# Patient Record
Sex: FEMALE | Race: OTHER
[De-identification: names, ages, dates, MRNs, and addresses within clinical notes are randomized per-mention and may not be internally consistent; named-entity substitution may affect disease eponyms.]

---

## 2021-02-09 ENCOUNTER — HOSPITAL ENCOUNTER (EMERGENCY)
Dept: HOSPITAL 72 - EMR | Age: 51
Discharge: HOME | End: 2021-02-09
Payer: MEDICAID

## 2021-02-09 VITALS — DIASTOLIC BLOOD PRESSURE: 63 MMHG | SYSTOLIC BLOOD PRESSURE: 121 MMHG

## 2021-02-09 VITALS — DIASTOLIC BLOOD PRESSURE: 65 MMHG | SYSTOLIC BLOOD PRESSURE: 123 MMHG

## 2021-02-09 VITALS — HEIGHT: 61 IN | BODY MASS INDEX: 41.54 KG/M2 | WEIGHT: 220 LBS

## 2021-02-09 DIAGNOSIS — R51.9: Primary | ICD-10-CM

## 2021-02-09 DIAGNOSIS — E78.00: ICD-10-CM

## 2021-02-09 DIAGNOSIS — E11.9: ICD-10-CM

## 2021-02-09 DIAGNOSIS — I10: ICD-10-CM

## 2021-02-09 PROCEDURE — 99284 EMERGENCY DEPT VISIT MOD MDM: CPT

## 2021-02-09 PROCEDURE — 70450 CT HEAD/BRAIN W/O DYE: CPT

## 2021-02-09 NOTE — NUR
ED Nurse Note:

Pt walked into ED for headaches 6/10 pain and bilateral hand numbness for 
weeks. Pt is alert and orientedx4, ambulatory. She has been seen by ERMD. She 
denies bodyaches, chills, fever, nausea, vomiting.

## 2021-02-09 NOTE — DIAGNOSTIC IMAGING REPORT
Indications: Headache

 

Technique: Spiral acquisitions obtained through the brain. Angled axial and coronal 5

x 5 mm slices were reconstructed. Total dose length product 992 mGycm.  CTDI vol(s)

53 mGy. Dose reduction achieved using automated exposure control

 

Comparison: None.

 

Findings: No acute intracranial hemorrhage or edema. No mass effect or midline shift.

Normal gray-white differentiation. Normal size ventricles and extra axial CSF spaces.

The mastoids are clear. The calvarium is intact. Visualized orbits and sinuses are

unremarkable.

 

Impression: Negative

 

 

 

 

 

The CT scanner at [facility] is accredited by the [employer]

[employer] and the scans are performed using protocols designed to limit radiation

exposure to as low as reasonably achievable to attain images of sufficient resolution

adequate for diagnostic evaluation.

## 2021-02-09 NOTE — EMERGENCY ROOM REPORT
History of Present Illness


General


Chief Complaint:  Headache


Source:  Patient





Present Illness


HPI


Disclaimer: Please note that this report is being documented using DRAGON 

technology. This can lead to erroneous entry secondary to incorrect 

interpretation by the dictating instrument.





HPI: 50-year-old female presents for evaluation of headache. History of 

hypertension, diabetes and hypercholesterolemia. States consistent bitemporal 

headache with intermittent blurred vision for the past 2 weeks. Seen by her PMD 

and screened for Covid 2 weeks ago initial headache onset. Referred by PMD today

to rule out intracranial mass or other pathology. Denies injury, fall, changes 

in mentation. Denies fever or chills. Patient reported to triage nurse that she 

had intermittent tingling in her arms when waking up in the morning. She sleeps 

with her arms crossed on her stomach and typically has tingling in her 

fingertips in the morning as a effect. Denies changes in strength sensation or 

coordination. No other symptoms at this time. Has been using aspirin to treat 

her headaches without significant improvement.


 


PMH: Hypertension, hyperlipidemia, diabetes, obesity


 


PSH: Reviewed


 


Allergies: Reviewed


 


Social Hx: Reviewed


Allergies:  


Coded Allergies:  


     No Known Allergies (Unverified , 2/9/21)





COVID-19 Screening


Contact w/high risk pt:  No


Experienced COVID-19 symptoms?:  No


COVID-19 Testing performed PTA:  Yes


COVID-19 Screening:  Negative COVID-19


COVID-19 Testing Source:  nasal





Nursing Documentation-PMH


Hx Hypertension:  Yes


Hx Diabetes:  Yes





Review of Systems


All Other Systems:  negative except mentioned in HPI





Physical Exam





Vital Signs








  Date Time  Temp Pulse Resp B/P (MAP) Pulse Ox O2 Delivery O2 Flow Rate FiO2


 


2/9/21 15:33 98.2 78 16 118/63 (81) 97 Room Air  





 





General: Awake and alert, no acute distress


HEENT: NC/AT. EOMI. PERRLA.  Visual fields are full.  No nystagmus.  Facial 

expressions are symmetrical.  No facial droop.


Cardiovascular: RRR.  S1 and S2 normal.  No murmur appreciated


Resp: Normal work of breathing. No cough, wheezing or crackles appreciated


Abdomen: Abdomen is soft, nondistended.  Nontender


Skin: Intact.  No abrasions, laceration or rash over the exposed skin


MSK: Normal tone and bulk. Moving all extremities.  No obvious deformity.  There

is no drift in the upper or lower extremities bilaterally.


Neuro: Awake and alert.  Mentating appropriately.  Facial expression 

symmetrical.  No dysarthria, no ataxia on finger-nose-finger or heel-to-shin 

testing.  Sensation to light touch is intact over the upper and lower 

extremities.  The patient has intact speech with good repetition, comprehension.

 Fund of knowledge is full.  No aphasia, no neglect.


NIH: 0





Medical Decision Making


Diagnostic Impression:  


   Primary Impression:  


   Headache


ER Course


Is a 50-year-old female presents for evaluation of 2 weeks persistent headache. 

Concern for intracranial mass sent by PMD for CT. CT is unremarkable.  No mass 

or bleed or other abnormalities identified.  I believe the tingling in her hands

is likely related to the way she is sleeping in a transient peripheral nerve 

issue due to compression.  Instructed her to sleep differently but she states 

she slept that way her whole life.  This can be followed up by her PMD.  Copies 

of CT report including discharge paperwork.  She can follow-up with PMD for 

further testing as needed.  Discussed return precautions.  She understands and 

agrees with this treatment plan.





Last Vital Signs








  Date Time  Temp Pulse Resp B/P (MAP) Pulse Ox O2 Delivery O2 Flow Rate FiO2


 


2/9/21 15:33 98.2 78 16 118/63 (81) 97 Room Air  








Disposition:  HOME, SELF-CARE


Condition:  Stable


Scripts


Ibuprofen* (MOTRIN*) 600 Mg Tablet


600 MG ORAL Q6H PRN for For Pain, #30 TAB 0 Refills


   Prov: Rashard Garcia MD         2/9/21











Rashard Garcia MD               Feb 9, 2021 15:47